# Patient Record
Sex: FEMALE | Race: WHITE | NOT HISPANIC OR LATINO | Employment: FULL TIME | URBAN - METROPOLITAN AREA
[De-identification: names, ages, dates, MRNs, and addresses within clinical notes are randomized per-mention and may not be internally consistent; named-entity substitution may affect disease eponyms.]

---

## 2021-12-07 ENCOUNTER — OFFICE VISIT (OUTPATIENT)
Dept: URGENT CARE | Facility: URGENT CARE | Age: 26
End: 2021-12-07
Payer: COMMERCIAL

## 2021-12-07 ENCOUNTER — ANCILLARY PROCEDURE (OUTPATIENT)
Dept: GENERAL RADIOLOGY | Facility: CLINIC | Age: 26
End: 2021-12-07
Attending: PHYSICIAN ASSISTANT
Payer: COMMERCIAL

## 2021-12-07 VITALS
HEIGHT: 65 IN | SYSTOLIC BLOOD PRESSURE: 112 MMHG | HEART RATE: 112 BPM | DIASTOLIC BLOOD PRESSURE: 83 MMHG | BODY MASS INDEX: 23.16 KG/M2 | OXYGEN SATURATION: 97 % | WEIGHT: 139 LBS

## 2021-12-07 DIAGNOSIS — R55 VASOVAGAL SYNCOPE: ICD-10-CM

## 2021-12-07 DIAGNOSIS — Z20.822 PERSON UNDER INVESTIGATION FOR COVID-19: ICD-10-CM

## 2021-12-07 DIAGNOSIS — R30.0 DYSURIA: Primary | ICD-10-CM

## 2021-12-07 DIAGNOSIS — R00.0 TACHYCARDIA: ICD-10-CM

## 2021-12-07 DIAGNOSIS — R30.0 DYSURIA: ICD-10-CM

## 2021-12-07 LAB
ALBUMIN UR-MCNC: 30 MG/DL
APPEARANCE UR: CLEAR
BACTERIA #/AREA URNS HPF: ABNORMAL /HPF
BILIRUB UR QL STRIP: ABNORMAL
CLUE CELLS: ABNORMAL
COLOR UR AUTO: YELLOW
FLUAV AG SPEC QL IA: NEGATIVE
FLUBV AG SPEC QL IA: NEGATIVE
GLUCOSE UR STRIP-MCNC: NEGATIVE MG/DL
HCG UR QL: NEGATIVE
HGB UR QL STRIP: NEGATIVE
KETONES UR STRIP-MCNC: 40 MG/DL
LEUKOCYTE ESTERASE UR QL STRIP: NEGATIVE
MUCOUS THREADS #/AREA URNS LPF: PRESENT /LPF
NITRATE UR QL: NEGATIVE
PH UR STRIP: 8.5 [PH] (ref 5–7)
RBC #/AREA URNS AUTO: ABNORMAL /HPF
SP GR UR STRIP: 1.01 (ref 1–1.03)
SQUAMOUS #/AREA URNS AUTO: ABNORMAL /LPF
TRICHOMONAS, WET PREP: ABNORMAL
UROBILINOGEN UR STRIP-ACNC: 4 E.U./DL
WBC #/AREA URNS AUTO: ABNORMAL /HPF
WBC'S/HIGH POWER FIELD, WET PREP: ABNORMAL
YEAST, WET PREP: ABNORMAL

## 2021-12-07 PROCEDURE — 93000 ELECTROCARDIOGRAM COMPLETE: CPT | Performed by: PHYSICIAN ASSISTANT

## 2021-12-07 PROCEDURE — 81025 URINE PREGNANCY TEST: CPT | Performed by: PHYSICIAN ASSISTANT

## 2021-12-07 PROCEDURE — 87210 SMEAR WET MOUNT SALINE/INK: CPT

## 2021-12-07 PROCEDURE — 99214 OFFICE O/P EST MOD 30 MIN: CPT | Performed by: PHYSICIAN ASSISTANT

## 2021-12-07 PROCEDURE — 74019 RADEX ABDOMEN 2 VIEWS: CPT | Performed by: RADIOLOGY

## 2021-12-07 PROCEDURE — 87804 INFLUENZA ASSAY W/OPTIC: CPT | Performed by: PHYSICIAN ASSISTANT

## 2021-12-07 PROCEDURE — 81001 URINALYSIS AUTO W/SCOPE: CPT

## 2021-12-07 ASSESSMENT — MIFFLIN-ST. JEOR: SCORE: 1381.38

## 2021-12-07 NOTE — PATIENT INSTRUCTIONS
"Emergency with chest pain, painful breaths, shortness of breath, severe headache, severe abdominal or back pain.      Patient Education     Viral Syndrome (Adult)  A viral illness may cause a number of symptoms such as fever. Other symptoms depend on the part of the body that the virus affects. If it settles in your nose, throat, and lungs, it may cause cough, sore throat, congestion, runny nose, headache, earache and other ear symptoms, or shortness of breath. If it settles in your stomach and intestinal tract, it may cause nausea, vomiting, cramping, and diarrhea. Sometimes it causes generalized symptoms like \"aching all over,\" feeling tired, loss of energy, or loss of appetite.  A viral illness usually lasts anywhere from several days to several weeks, but sometimes it lasts longer. In some cases, a more serious infection can look like a viral syndrome in the first few days of the illness. You may need another exam and additional tests to know the difference. Watch for the warning signs listed below for when to seek medical advice.  Home care  Follow these guidelines for taking care of yourself at home:    If symptoms are severe, rest at home for the first 2 to 3 days.    Stay away from cigarette smoke - both your smoke and the smoke from others.    You may use over-the-counter acetaminophen or ibuprofen for fever, muscle aching, and headache, unless another medicine was prescribed for this. If you have chronic liver or kidney disease or ever had a stomach ulcer or gastrointestinal bleeding, talk with your healthcare provider before using these medicines. No one who is younger than 18 and ill with a fever should take aspirin. It may cause severe disease or death.    Your appetite may be poor, so a light diet is fine. Avoid dehydration by drinking 8 to 12, 8-ounce glasses of fluids each day. This may include water; orange juice; lemonade; apple, grape, and cranberry juice; clear fruit drinks; electrolyte " "replacement and sports drinks; and decaffeinated teas and coffee. If you have been diagnosed with a kidney disease, ask your healthcare provider how much and what types of fluids you should drink to prevent dehydration. If you have kidney disease, drinking too much fluid can cause it build up in the your body and be dangerous to your health.    Over-the-counter remedies won't shorten the length of the illness but may be helpful for symptoms such as cough, sore throat, nasal and sinus congestion, or diarrhea. Don't use decongestants if you have high blood pressure.  Follow-up care  Follow up with your healthcare provider if you do not improve over the next week.  Call 911  Call 911 if any of the following occur:    Convulsion    Feeling weak, dizzy, or like you are going to faint    Chest pain, or more than mild shortness of breath  When to seek medical advice  Call your healthcare provider right away if any of these occur:    Cough with lots of colored sputum (mucus) or blood in your sputum    Chest pain, shortness of breath, wheezing, or trouble breathing    Severe headache; face, neck, or ear pain    Severe, constant pain in the lower right side of your belly (abdominal)    Continued vomiting (can t keep liquids down)    Frequent diarrhea (more than 5 times a day); blood (red or black color) or mucus in diarrhea    Feeling weak, dizzy, or like you are going to faint    Extreme thirst    Fever of 100.4 F (38 C) or higher, or as directed by your healthcare provider  [employer] last reviewed this educational content on 4/1/2018 2000-2021 [employer]. All rights reserved. This information is not intended as a substitute for professional medical care. Always follow your healthcare professional's instructions.           Patient Education     Vomiting (Adult)  Vomiting is a common symptom that may be due to different causes. These include gastroenteritis (\"stomach flu\"), food poisoning and gastritis. There " are other more serious causes of vomiting which may be hard to diagnose early in the illness. Therefore, it is important to watch for the warning signs listed below.  The main danger from repeated vomiting is dehydration. This is due to excess loss of water and minerals from the body. When this occurs, your body fluids must be replaced.  Home care    If symptoms are severe, rest at home for the next 24 hours.    Because your symptoms may be from an infection, wash your hands often and well. If soap and water are not available, use alcohol-based  to keep from spreading the infection to others.    Wash your hands for at least 20 seconds. Humming the happy birthday song twice while you wash is an easy way to make sure you've washed for 20 seconds.    Wash your hands after using the toilet, before and after preparing food, before eating food, after changing a diaper, cleaning a wound, caring for a sick person, and blowing your nose, coughing, or sneezing. You should also wash your hands after caring for someone who is sick, touching pet food, or treats, and touching an animal, or animal waste.    You may use acetaminophen or NSAID medicines like ibuprofen or naproxen to control fever, unless another medicine was prescribed. If you have chronic liver or kidney disease or ever had a stomach ulcer or gastrointestinal bleeding, talk with your doctor before using these medicines. Aspirin should never be used in anyone under 18 years of age who is ill with a fever. It may cause severe liver damage. Don't use NSAID medicines if you are already taking one for another condition (like arthritis) or are on aspirin (such as for heart disease, or after a stroke)    Don't use tobacco and or drink alcohol, which may worsen your symptoms.    If medicines for vomiting were prescribed, take as directed.    Once vomiting stops, then follow these guidelines:  During the first 12 to 24 hours follow the diet below:    Fruit juices.  Apple, grape juice, clear fruit drinks, and electrolyte replacement drinks.    Beverages. Soft drinks without caffeine; mineral water (plain or flavored), decaffeinated tea and coffee.    Soups. Clear broth and bouillon    Desserts. Plain gelatin, ice pops, and fruit juice bars. As you feel better, you may add 6 to 8 ounces of yogurt per day.  During the next 24 hours you may add the following to the above:    Hot cereal, plain toast, bread, rolls, crackers    Plain noodles, rice, mashed potatoes, chicken noodle or rice soup    Unsweetened canned fruit such as applesauce, bananas (avoid pineapple and citrus)    Limit caffeine and chocolate. No spices or seasonings except salt.  During the next 24 hours:  Gradually resume a normal diet, as you feel better and your symptoms lessen.  Follow-up care  Follow up with your healthcare provider, or as advised.  When to seek medical advice  Call your healthcare provider right away if any of these occur:    Constant right-sided lower belly pain or increasing general belly pain    Continued vomiting (unable to keep liquids down) for 24 hours    Vomiting blood or coffee grounds    Swollen belly    Frequent diarrhea (more than 5 times a day); blood (red or black color) or mucus in diarrhea    Reduced urine output or extreme thirst    Weakness, dizziness or fainting    Unusually drowsy or confused    Fever of 100.4 F (38 C) oral or higher, or as directed    Yellow color of the eyes or skin  [employer] last reviewed this educational content on 3/1/2018    5933-4167 [employer]. All rights reserved. This information is not intended as a substitute for professional medical care. Always follow your healthcare professional's instructions.             Follow up immediately with severe headache, chest pain, or shortness of breath    Rest, isolate for 10 days, hydrate, follow up if worsening or new symptoms  Household members to isolate until test results, if positive isolate  for 2 weeks and follow up for testing if symptoms occur         Patient Education     Coronavirus Disease 2019 (COVID-19): Caring for Yourself or Others   If you or a household member have symptoms of COVID-19, follow the guidelines below. This will help you manage symptoms and keep the virus from spreading.  If you have symptoms of COVID-19    Stay home and contact your care team. They will tell you what to do.    Don t panic. Keep in mind that other illnesses can cause similar symptoms.    Stay away from work, school, and public places.    Limit physical contact with others in your home. Limit visitors. No kissing.  Clean surfaces you touch with disinfectant.  If you need to cough or sneeze, do it into a tissue. Then throw the tissue into the trash. If you don't have tissues, cough or sneeze into the bend of your elbow.  Don t share food or personal items with people in your household. This includes items like eating and drinking utensils, towels, and bedding.  Wear a cloth face mask around other people. During a public health emergency, medical face masks may be reserved for healthcare workers. You may need to make a cloth face mask of your own. You can do this using a bandana, T-shirt, or other cloth. The CDC has instructions on how to make a face mask. Wear the mask so that it covers both your nose and mouth.  If you need to go to a hospital or clinic, call ahead to let them know. Expect the care team to wear masks, gowns, gloves, and eye protection. You may need to come to a different entrance or wait in a separate room.  Follow all instructions from your care team.    If you ve been diagnosed with COVID-19    Stay home and away from others, including other people in your home. (This is called self-isolation.) Don t leave home unless you need to get medical care. Don t go to work, school, or public places. Don t use buses, taxis, or other public transportation.    Follow all instructions from your care  team.    If you need to go to a hospital or clinic, call ahead to let them know. Expect the care team to wear masks, gowns, gloves, and eye protection. You may need to come to a different entrance or wait in a separate room.    Wear a face mask over your nose and mouth. This is to protect others from your germs. If you can t wear a mask, your caregivers should wear one. You may need to make your own mask using a bandana, T-shirt, or other cloth. See the CDC s instructions on how to make a face mask.    Have no contact with pets and other animals.    Don t share food or personal items with people in your household. This includes items like eating and drinking utensils, towels, and bedding.    If you need to cough or sneeze, do it into a tissue. Then throw the tissue into the trash. If you don't have tissues, cough or sneeze into the bend of your elbow.    Wash your hands often.    Self-care at home   At this time, there is no medicine approved to prevent or treat COVID-19. The FDA has approved an antiviral medicine (called remdesivir) for people being treated in the hospital. This is for people 12 years and older who weigh more than about 88 pounds (40 kgs). In certain cases, it may also be used for people younger than 12 years or who weigh less than about 88 pounds (40 kgs)..  Currently, treatment is mostly aimed at helping your body while it fights the virus.    Getting extra rest.    Drink extra fluids 6 to 8 glasses of liquids each day), unless a doctor has told you not to. Ask your care team which fluids are best for you. Avoid fluids that contain caffeine or alcohol.    Taking over-the-counter (OTC) medicine to reduce pain and fever. Your care team will tell you which medicine to use.  If you ve been in the hospital for COVID-19, follow your care team s instructions. They will tell you when to stop self-isolation. They may also have you change positions to help your breathing (such as lying on your belly).  If  a test showed that you have COVID-19, you may be asked to donate plasma after you ve recovered. (This is called COVID-19 convalescent plasma donation.) The plasma may contain antibodies to help fight the virus in others. Experts don't know if the plasma will work well as a treatment. Research continues, and the FDA has approved it for emergency use in certain people with serious or life-threatening COVID-19. For more information, talk to your care team.  Caring for a sick person     Follow all instructions from the care team.    Wash your hands often.    Wear protective clothing as advised.    Make sure the sick person wears a mask. If they can't wear a mask, don t stay in the same room with them. If you must be in the same room, wear a face mask. Make sure the mask covers both the nose and mouth.    Keep track of the sick person s symptoms.    Clean surfaces often with disinfectant. This includes phones, kitchen counters, fridge door handles, bathroom surfaces, and others.    Don t let anyone share household items with the sick person. This includes eating and drinking tools, towels, sheets, or blankets.    Clean fabrics and laundry well.    Keep other people and pets away from the sick person.    When you can stop self-isolation  When you are sick with COVID-19, you should stay away from other people. This is called self-isolation. The rules for ending self-isolation depend on your health in general.  If you are normally healthy:  You can stop self-isolation when all 3 of these are true:    You ve had no fever--and no medicine that reduces fever--for at least 24 hours. And     Your symptoms are better, such as cough or trouble breathing. And     At least 10 days have passed since your symptoms first started.  Talk with your care team before you leave home. They may tell you it s okay to leave, or they may give you different advice. You do not need to be re-tested.  If you have a weak immune system, or you ve had  severe COVID-19:  Follow your care team s instructions. You may be asked to self-isolate for 10 days to 20 days after your symptoms first started. Your care team may want to re-test you for COVID-19.  Note: If you re being treated for cancer, have an immune disorder (such as HIV), or have had a transplant (organ or bone marrow), you may have a weak immune system.  If you've already had COVID-19 once:  If you had the virus over 3 months ago, and you ve been exposed again, treat it like you've never had COVID-19. Stay home, limit your contact with others, call your care team, and watch for symptoms.  If it s been less than 3 months since you had the virus, you re symptom-free, and you ve been exposed again: You don t need to self-isolate or be re-tested. But if you develop new symptoms that can t be linked to another illness, please self-isolate and contact your care team.  When you return to public settings  When you are well enough to go outside your home, follow the CDC s guidance on cloth face masks.    Anyone over age 2 should wear a face mask in public, especially when it's hard to socially distance. This includes public transit, public protests and marches, and crowded stores, bars, and restaurants.    Face masks are most likely to reduce the spread of COVID-19 when they are widely used by people who are out in the public.  Certain people should not wear a face covering. These include:    Children under 2 years old    Anyone with a health, developmental, or mental health condition that can be made worse by wearing a mask    Anyone who is unconscious or unable to remove the face covering without help. See the CDC's guidance on who should not wear a face mask.  When to call your care team  Call your care team right away if a sick person has any of these:    Trouble breathing    Pain or pressure in chest  If a sick person has any of these, call 911:    Trouble breathing that gets worse    Pain or pressure in chest  that gets worse    Blue tint to lips or face    Fast or irregular heartbeat    Confusion or trouble waking    Fainting or loss of consciousness    Coughing up blood  Going home from the hospital   If you have COVID-19 and were recently in the hospital:    Follow the instructions above for self-care and isolation.    Follow the hospital care team s instructions.    Ask questions if anything is unclear to you. Write down answers so you remember them.  Date last modified: 11/23/2020  [employer] last reviewed this educational content on 4/1/2020  This information has been modified by your health care provider with permission from the publisher.    7531-3724 [employer]. [address] [place] [address], [place] [address]. All rights reserved. This information is not intended as a substitute for professional medical care. Always follow your healthcare professional's instructions.

## 2021-12-07 NOTE — PROGRESS NOTES
"SUBJECTIVE:   Daniela Constantino is a 26 year old female presenting with a chief complaint of nausea, shaky. Vomiting last night.  Has not vomited since 4 am.  Some frequency or hesitancy last night.  No dysuria.  Suprapubic pressure last night, relieved with voimiting.    No abdominal pain, some nausea.      No history of fever, URI symptoms.        There is no immunization history on file for this patient.        No past medical history on file.  No current outpatient medications on file.     Social History     Tobacco Use     Smoking status: Never Smoker     Smokeless tobacco: Never Used   Substance Use Topics     Alcohol use: Not Currently       ROS:  Review of systems negative except as stated above.    OBJECTIVE:  /83   Pulse (!) 131   Ht 1.651 m (5' 5\")   Wt 63 kg (139 lb)   SpO2 97%   BMI 23.13 kg/m    GENERAL APPEARANCE: healthy, alert and no distress  EYES: EOMI,  PERRL, conjunctiva clear  HENT: ear canals and TM's normal.  Nose and mouth without ulcers, erythema or lesions  NECK: supple, nontender, no lymphadenopathy  RESP: lungs clear to auscultation - no rales, rhonchi or wheezes  CV: regular rates and rhythm, normal S1 S2, no murmur noted  ABDOMEN:  soft, nontender, no HSM or masses and bowel sounds normal  NEURO: Normal strength and tone, sensory exam grossly normal,  normal speech and mentation  SKIN: no suspicious lesions or rashes      Results for orders placed or performed in visit on 12/07/21   Basic metabolic panel  (Ca, Cl, CO2, Creat, Gluc, K, Na, BUN)     Status: Abnormal   Result Value Ref Range    Sodium 137 133 - 144 mmol/L    Potassium 3.6 3.4 - 5.3 mmol/L    Chloride 107 94 - 109 mmol/L    Carbon Dioxide (CO2) 28 20 - 32 mmol/L    Anion Gap 2 (L) 3 - 14 mmol/L    Urea Nitrogen 8 7 - 30 mg/dL    Creatinine 0.70 0.52 - 1.04 mg/dL    Calcium 9.9 8.5 - 10.1 mg/dL    Glucose 104 (H) 70 - 99 mg/dL    GFR Estimate >90 >60 mL/min/1.73m2   CBC with platelets and differential     " Status: Abnormal   Result Value Ref Range    WBC Count 11.3 (H) 4.0 - 11.0 10e3/uL    RBC Count 4.44 3.80 - 5.20 10e6/uL    Hemoglobin 13.4 11.7 - 15.7 g/dL    Hematocrit 39.4 35.0 - 47.0 %    MCV 89 78 - 100 fL    MCH 30.2 26.5 - 33.0 pg    MCHC 34.0 31.5 - 36.5 g/dL    RDW 11.8 10.0 - 15.0 %    Platelet Count 209 150 - 450 10e3/uL    % Neutrophils 85 %    % Lymphocytes 7 %    % Monocytes 8 %    % Eosinophils 0 %    % Basophils 0 %    Absolute Neutrophils 9.6 (H) 1.6 - 8.3 10e3/uL    Absolute Lymphocytes 0.7 (L) 0.8 - 5.3 10e3/uL    Absolute Monocytes 0.9 0.0 - 1.3 10e3/uL    Absolute Eosinophils 0.0 0.0 - 0.7 10e3/uL    Absolute Basophils 0.0 0.0 - 0.2 10e3/uL   CBC with platelets and differential     Status: Abnormal    Narrative    The following orders were created for panel order CBC with platelets and differential.  Procedure                               Abnormality         Status                     ---------                               -----------         ------                     CBC with platelets and d...[[id number]]  Abnormal            Final result                 Please view results for these tests on the individual orders.   Results for orders placed or performed in visit on 12/07/21   XR KUB     Status: None    Narrative    XR KUB 12/7/2021 3:20 PM    HISTORY: Dysuria    COMPARISON: None.      Impression    IMPRESSION:  1. Right pelvic phleboliths. No definite radiodensities to suggest  urinary tract calculi.  2. Nonobstructive bowel gas pattern with mild rectal fecal loading.    FAVIOLA ZHOU MD         SYSTEM ID:  [id number]   Results for orders placed or performed in visit on 12/07/21   UA macro with reflex to Microscopic and Culture - Clinc Collect     Status: Abnormal    Specimen: Urine, Midstream   Result Value Ref Range    Color Urine Yellow Colorless, Straw, Light Yellow, Yellow    Appearance Urine Clear Clear    Glucose Urine Negative Negative mg/dL    Bilirubin Urine Small (A)  Negative    Ketones Urine 40  (A) Negative mg/dL    Specific Gravity Urine 1.015 1.003 - 1.035    Blood Urine Negative Negative    pH Urine 8.5 (H) 5.0 - 7.0    Protein Albumin Urine 30  (A) Negative mg/dL    Urobilinogen Urine 4.0 (A) 0.2, 1.0 E.U./dL    Nitrite Urine Negative Negative    Leukocyte Esterase Urine Negative Negative   Urine Microscopic     Status: Abnormal   Result Value Ref Range    Bacteria Urine Moderate (A) None Seen /HPF    RBC Urine 0-2 0-2 /HPF /HPF    WBC Urine 0-5 0-5 /HPF /HPF    Squamous Epithelials Urine Moderate (A) None Seen /LPF    Mucus Urine Present (A) None Seen /LPF    Narrative    Urine Culture not indicated   HCG qualitative urine     Status: Normal   Result Value Ref Range    hCG Urine Qualitative Negative Negative   Wet prep - Clinic Collect     Status: Abnormal    Specimen: Vagina; Swab   Result Value Ref Range    Trichomonas Absent Absent    Yeast Absent Absent    Clue Cells Absent Absent    WBCs/high power field 1+ (A) None   Influenza A & B Antigen - Clinic Collect     Status: Normal    Specimen: Nasopharyngeal; Swab   Result Value Ref Range    Influenza A antigen Negative Negative    Influenza B antigen Negative Negative    Narrative    Test results must be correlated with clinical data. If necessary, results should be confirmed by a molecular assay or viral culture.       ASSESSMENT:  (Z20.822) Person under investigation for COVID-19  (R30.0) Dysuria  (primary encounter diagnosis)  Plan: UA macro with reflex to Microscopic and Culture        - Clinc Collect, Wet prep - Clinic Collect,         Urine Microscopic, Symptomatic COVID-19 Virus         (Coronavirus) by PCR, Influenza A & B Antigen -        Clinic Collect, HCG Qual, Urine ([id number]), CBC         with platelets and differential, Basic         metabolic panel  (Ca, Cl, CO2, Creat, Gluc, K,         Na, BUN), XR KUB, Urine Culture Aerobic         Bacterial - lab collect, HCG qualitative urine    (R00.0)  "Tachycardia  (R55) Vasovagal syncope  Plan: EKG 12-lead complete w/read - Clinics          Covid-19  Pt was evaluated during a global COVID-19 pandemic, which necessitated consideration that the patient might be at risk for infection with the SARS-CoV-2 virus that causes COVID-19.   Applicable protocols for evaluation were followed during the patient's care.   COVID-19 was considered as part of the patient's evaluation. The plan for testing is:  a test was ordered during this visit.    No severe headache, chest pain, shortness of breath  No additional infectious symptoms  Rest, isolate for 10 days, hydrate, test, follow up if worsening or new symptoms  HH members to isolate until test results, if positive isolate for 2 weeks and follow up for testing if symptoms occur  Red flags and emergent follow up discussed, and understood by patient  Follow up with PCP if symptoms worsen or fail to improve    Surgical mask, gown, shield, hairnet, gloves worn by provider      Patient Instructions   Emergency with chest pain, painful breaths, shortness of breath, severe headache, severe abdominal or back pain.      Patient Education     Viral Syndrome (Adult)  A viral illness may cause a number of symptoms such as fever. Other symptoms depend on the part of the body that the virus affects. If it settles in your nose, throat, and lungs, it may cause cough, sore throat, congestion, runny nose, headache, earache and other ear symptoms, or shortness of breath. If it settles in your stomach and intestinal tract, it may cause nausea, vomiting, cramping, and diarrhea. Sometimes it causes generalized symptoms like \"aching all over,\" feeling tired, loss of energy, or loss of appetite.  A viral illness usually lasts anywhere from several days to several weeks, but sometimes it lasts longer. In some cases, a more serious infection can look like a viral syndrome in the first few days of the illness. You may need another exam and additional " tests to know the difference. Watch for the warning signs listed below for when to seek medical advice.  Home care  Follow these guidelines for taking care of yourself at home:    If symptoms are severe, rest at home for the first 2 to 3 days.    Stay away from cigarette smoke - both your smoke and the smoke from others.    You may use over-the-counter acetaminophen or ibuprofen for fever, muscle aching, and headache, unless another medicine was prescribed for this. If you have chronic liver or kidney disease or ever had a stomach ulcer or gastrointestinal bleeding, talk with your healthcare provider before using these medicines. No one who is younger than 18 and ill with a fever should take aspirin. It may cause severe disease or death.    Your appetite may be poor, so a light diet is fine. Avoid dehydration by drinking 8 to 12, 8-ounce glasses of fluids each day. This may include water; orange juice; lemonade; apple, grape, and cranberry juice; clear fruit drinks; electrolyte replacement and sports drinks; and decaffeinated teas and coffee. If you have been diagnosed with a kidney disease, ask your healthcare provider how much and what types of fluids you should drink to prevent dehydration. If you have kidney disease, drinking too much fluid can cause it build up in the your body and be dangerous to your health.    Over-the-counter remedies won't shorten the length of the illness but may be helpful for symptoms such as cough, sore throat, nasal and sinus congestion, or diarrhea. Don't use decongestants if you have high blood pressure.  Follow-up care  Follow up with your healthcare provider if you do not improve over the next week.  Call 911  Call 911 if any of the following occur:    Convulsion    Feeling weak, dizzy, or like you are going to faint    Chest pain, or more than mild shortness of breath  When to seek medical advice  Call your healthcare provider right away if any of these occur:    Cough with lots  "of colored sputum (mucus) or blood in your sputum    Chest pain, shortness of breath, wheezing, or trouble breathing    Severe headache; face, neck, or ear pain    Severe, constant pain in the lower right side of your belly (abdominal)    Continued vomiting (can t keep liquids down)    Frequent diarrhea (more than 5 times a day); blood (red or black color) or mucus in diarrhea    Feeling weak, dizzy, or like you are going to faint    Extreme thirst    Fever of 100.4 F (38 C) or higher, or as directed by your healthcare provider  [employer] last reviewed this educational content on 4/1/2018 2000-2021 [employer] All rights reserved. This information is not intended as a substitute for professional medical care. Always follow your healthcare professional's instructions.           Patient Education     Vomiting (Adult)  Vomiting is a common symptom that may be due to different causes. These include gastroenteritis (\"stomach flu\"), food poisoning and gastritis. There are other more serious causes of vomiting which may be hard to diagnose early in the illness. Therefore, it is important to watch for the warning signs listed below.  The main danger from repeated vomiting is dehydration. This is due to excess loss of water and minerals from the body. When this occurs, your body fluids must be replaced.  Home care    If symptoms are severe, rest at home for the next 24 hours.    Because your symptoms may be from an infection, wash your hands often and well. If soap and water are not available, use alcohol-based  to keep from spreading the infection to others.    Wash your hands for at least 20 seconds. Humming the happy birthday song twice while you wash is an easy way to make sure you've washed for 20 seconds.    Wash your hands after using the toilet, before and after preparing food, before eating food, after changing a diaper, cleaning a wound, caring for a sick person, and blowing your nose, " coughing, or sneezing. You should also wash your hands after caring for someone who is sick, touching pet food, or treats, and touching an animal, or animal waste.    You may use acetaminophen or NSAID medicines like ibuprofen or naproxen to control fever, unless another medicine was prescribed. If you have chronic liver or kidney disease or ever had a stomach ulcer or gastrointestinal bleeding, talk with your doctor before using these medicines. Aspirin should never be used in anyone under 18 years of age who is ill with a fever. It may cause severe liver damage. Don't use NSAID medicines if you are already taking one for another condition (like arthritis) or are on aspirin (such as for heart disease, or after a stroke)    Don't use tobacco and or drink alcohol, which may worsen your symptoms.    If medicines for vomiting were prescribed, take as directed.    Once vomiting stops, then follow these guidelines:  During the first 12 to 24 hours follow the diet below:    Fruit juices. Apple, grape juice, clear fruit drinks, and electrolyte replacement drinks.    Beverages. Soft drinks without caffeine; mineral water (plain or flavored), decaffeinated tea and coffee.    Soups. Clear broth and bouillon    Desserts. Plain gelatin, ice pops, and fruit juice bars. As you feel better, you may add 6 to 8 ounces of yogurt per day.  During the next 24 hours you may add the following to the above:    Hot cereal, plain toast, bread, rolls, crackers    Plain noodles, rice, mashed potatoes, chicken noodle or rice soup    Unsweetened canned fruit such as applesauce, bananas (avoid pineapple and citrus)    Limit caffeine and chocolate. No spices or seasonings except salt.  During the next 24 hours:  Gradually resume a normal diet, as you feel better and your symptoms lessen.  Follow-up care  Follow up with your healthcare provider, or as advised.  When to seek medical advice  Call your healthcare provider right away if any of these  occur:    Constant right-sided lower belly pain or increasing general belly pain    Continued vomiting (unable to keep liquids down) for 24 hours    Vomiting blood or coffee grounds    Swollen belly    Frequent diarrhea (more than 5 times a day); blood (red or black color) or mucus in diarrhea    Reduced urine output or extreme thirst    Weakness, dizziness or fainting    Unusually drowsy or confused    Fever of 100.4 F (38 C) oral or higher, or as directed    Yellow color of the eyes or skin  [employer] last reviewed this educational content on 3/1/2018    1238-9230 [employer]. All rights reserved. This information is not intended as a substitute for professional medical care. Always follow your healthcare professional's instructions.             Follow up immediately with severe headache, chest pain, or shortness of breath    Rest, isolate for 10 days, hydrate, follow up if worsening or new symptoms  Household members to isolate until test results, if positive isolate for 2 weeks and follow up for testing if symptoms occur         Patient Education     Coronavirus Disease 2019 (COVID-19): Caring for Yourself or Others   If you or a household member have symptoms of COVID-19, follow the guidelines below. This will help you manage symptoms and keep the virus from spreading.  If you have symptoms of COVID-19    Stay home and contact your care team. They will tell you what to do.    Don t panic. Keep in mind that other illnesses can cause similar symptoms.    Stay away from work, school, and public places.    Limit physical contact with others in your home. Limit visitors. No kissing.  Clean surfaces you touch with disinfectant.  If you need to cough or sneeze, do it into a tissue. Then throw the tissue into the trash. If you don't have tissues, cough or sneeze into the bend of your elbow.  Don t share food or personal items with people in your household. This includes items like eating and drinking  utensils, towels, and bedding.  Wear a cloth face mask around other people. During a public health emergency, medical face masks may be reserved for healthcare workers. You may need to make a cloth face mask of your own. You can do this using a bandana, T-shirt, or other cloth. The [facility] has instructions on how to make a face mask. Wear the mask so that it covers both your nose and mouth.  If you need to go to a hospital or clinic, call ahead to let them know. Expect the care team to wear masks, gowns, gloves, and eye protection. You may need to come to a different entrance or wait in a separate room.  Follow all instructions from your care team.    If you ve been diagnosed with COVID-19    Stay home and away from others, including other people in your home. (This is called self-isolation.) Don t leave home unless you need to get medical care. Don t go to work, school, or public places. Don t use buses, taxis, or other public transportation.    Follow all instructions from your care team.    If you need to go to a hospital or clinic, call ahead to let them know. Expect the care team to wear masks, gowns, gloves, and eye protection. You may need to come to a different entrance or wait in a separate room.    Wear a face mask over your nose and mouth. This is to protect others from your germs. If you can t wear a mask, your caregivers should wear one. You may need to make your own mask using a bandana, T-shirt, or other cloth. See the [facility] s instructions on how to make a face mask.    Have no contact with pets and other animals.    Don t share food or personal items with people in your household. This includes items like eating and drinking utensils, towels, and bedding.    If you need to cough or sneeze, do it into a tissue. Then throw the tissue into the trash. If you don't have tissues, cough or sneeze into the bend of your elbow.    Wash your hands often.    Self-care at home   At this time, there is no medicine  approved to prevent or treat COVID-19. The FDA has approved an antiviral medicine (called remdesivir) for people being treated in the hospital. This is for people 12 years and older who weigh more than about 88 pounds (40 kgs). In certain cases, it may also be used for people younger than 12 years or who weigh less than about 88 pounds (40 kgs)..  Currently, treatment is mostly aimed at helping your body while it fights the virus.    Getting extra rest.    Drink extra fluids 6 to 8 glasses of liquids each day), unless a doctor has told you not to. Ask your care team which fluids are best for you. Avoid fluids that contain caffeine or alcohol.    Taking over-the-counter (OTC) medicine to reduce pain and fever. Your care team will tell you which medicine to use.  If you ve been in the hospital for COVID-19, follow your care team s instructions. They will tell you when to stop self-isolation. They may also have you change positions to help your breathing (such as lying on your belly).  If a test showed that you have COVID-19, you may be asked to donate plasma after you ve recovered. (This is called COVID-19 convalescent plasma donation.) The plasma may contain antibodies to help fight the virus in others. Experts don't know if the plasma will work well as a treatment. Research continues, and the FDA has approved it for emergency use in certain people with serious or life-threatening COVID-19. For more information, talk to your care team.  Caring for a sick person     Follow all instructions from the care team.    Wash your hands often.    Wear protective clothing as advised.    Make sure the sick person wears a mask. If they can't wear a mask, don t stay in the same room with them. If you must be in the same room, wear a face mask. Make sure the mask covers both the nose and mouth.    Keep track of the sick person s symptoms.    Clean surfaces often with disinfectant. This includes phones, kitchen counters, fridge door  handles, bathroom surfaces, and others.    Don t let anyone share household items with the sick person. This includes eating and drinking tools, towels, sheets, or blankets.    Clean fabrics and laundry well.    Keep other people and pets away from the sick person.    When you can stop self-isolation  When you are sick with COVID-19, you should stay away from other people. This is called self-isolation. The rules for ending self-isolation depend on your health in general.  If you are normally healthy:  You can stop self-isolation when all 3 of these are true:    You ve had no fever--and no medicine that reduces fever--for at least 24 hours. And     Your symptoms are better, such as cough or trouble breathing. And     At least 10 days have passed since your symptoms first started.  Talk with your care team before you leave home. They may tell you it s okay to leave, or they may give you different advice. You do not need to be re-tested.  If you have a weak immune system, or you ve had severe COVID-19:  Follow your care team s instructions. You may be asked to self-isolate for 10 days to 20 days after your symptoms first started. Your care team may want to re-test you for COVID-19.  Note: If you re being treated for cancer, have an immune disorder (such as HIV), or have had a transplant (organ or bone marrow), you may have a weak immune system.  If you've already had COVID-19 once:  If you had the virus over 3 months ago, and you ve been exposed again, treat it like you've never had COVID-19. Stay home, limit your contact with others, call your care team, and watch for symptoms.  If it s been less than 3 months since you had the virus, you re symptom-free, and you ve been exposed again: You don t need to self-isolate or be re-tested. But if you develop new symptoms that can t be linked to another illness, please self-isolate and contact your care team.  When you return to public settings  When you are well enough to  go outside your home, follow the CDC s guidance on cloth face masks.    Anyone over age 2 should wear a face mask in public, especially when it's hard to socially distance. This includes public transit, public protests and marches, and crowded stores, bars, and restaurants.    Face masks are most likely to reduce the spread of COVID-19 when they are widely used by people who are out in the public.  Certain people should not wear a face covering. These include:    Children under 2 years old    Anyone with a health, developmental, or mental health condition that can be made worse by wearing a mask    Anyone who is unconscious or unable to remove the face covering without help. See the CDC's guidance on who should not wear a face mask.  When to call your care team  Call your care team right away if a sick person has any of these:    Trouble breathing    Pain or pressure in chest  If a sick person has any of these, call 911:    Trouble breathing that gets worse    Pain or pressure in chest that gets worse    Blue tint to lips or face    Fast or irregular heartbeat    Confusion or trouble waking    Fainting or loss of consciousness    Coughing up blood  Going home from the hospital   If you have COVID-19 and were recently in the hospital:    Follow the instructions above for self-care and isolation.    Follow the hospital care team s instructions.    Ask questions if anything is unclear to you. Write down answers so you remember them.  Date last modified: 11/23/2020  [employer] last reviewed this educational content on 4/1/2020  This information has been modified by your health care provider with permission from the publisher.    4666-9460 [employer]. [address] [place] [address], [place] [address]. All rights reserved. This information is not intended as a substitute for professional medical care. Always follow your healthcare professional's instructions.

## 2022-06-01 ENCOUNTER — LAB REQUISITION (OUTPATIENT)
Dept: LAB | Facility: CLINIC | Age: 27
End: 2022-06-01

## 2022-06-01 PROCEDURE — 86481 TB AG RESPONSE T-CELL SUSP: CPT | Performed by: INTERNAL MEDICINE

## 2022-06-03 LAB
QUANTIFERON MITOGEN: 10 IU/ML
QUANTIFERON NIL TUBE: 0.01 IU/ML
QUANTIFERON TB1 TUBE: 0.01 IU/ML
QUANTIFERON TB2 TUBE: 0.02

## 2022-06-04 LAB
GAMMA INTERFERON BACKGROUND BLD IA-ACNC: 0.01 IU/ML
M TB IFN-G BLD-IMP: NEGATIVE
M TB IFN-G CD4+ BCKGRND COR BLD-ACNC: 9.99 IU/ML
MITOGEN IGNF BCKGRD COR BLD-ACNC: 0 IU/ML
MITOGEN IGNF BCKGRD COR BLD-ACNC: 0.01 IU/ML

## 2024-03-19 ENCOUNTER — OFFICE VISIT (OUTPATIENT)
Dept: OBGYN | Facility: CLINIC | Age: 29
End: 2024-03-19
Payer: COMMERCIAL

## 2024-03-19 VITALS
SYSTOLIC BLOOD PRESSURE: 112 MMHG | WEIGHT: 138 LBS | HEIGHT: 66 IN | BODY MASS INDEX: 22.18 KG/M2 | DIASTOLIC BLOOD PRESSURE: 66 MMHG

## 2024-03-19 DIAGNOSIS — Z12.4 SCREENING FOR MALIGNANT NEOPLASM OF CERVIX: ICD-10-CM

## 2024-03-19 DIAGNOSIS — Z30.09 GENERAL COUNSELING AND ADVICE FOR CONTRACEPTIVE MANAGEMENT: ICD-10-CM

## 2024-03-19 DIAGNOSIS — Z01.419 ENCOUNTER FOR GYNECOLOGICAL EXAMINATION WITHOUT ABNORMAL FINDING: Primary | ICD-10-CM

## 2024-03-19 PROCEDURE — 99385 PREV VISIT NEW AGE 18-39: CPT | Performed by: OBSTETRICS & GYNECOLOGY

## 2024-03-19 PROCEDURE — G0145 SCR C/V CYTO,THINLAYER,RESCR: HCPCS | Performed by: OBSTETRICS & GYNECOLOGY

## 2024-03-19 NOTE — PROGRESS NOTES
Preventive Care Visit  [facility] SUAD Singh MD, OB/Gyn  Mar 19, 2024       SUBJECTIVE:   Daniela is a 28 year old, presenting for the following:  No chief complaint on file.      Due for Pap. Not sexually active but getting  in 4 months. Wanted info on contraception, preferably ones that won't adversely affect mood because some OCPs have done that in the past, as well as help her acne.        Have you ever done Advance Care Planning? (For example, a Health Directive, POLST, or a discussion with a medical provider or your loved ones about your wishes): No, advance care planning information given to patient to review.  Patient plans to discuss their wishes with loved ones or provider.      Social History     Tobacco Use    Smoking status: Never    Smokeless tobacco: Never   Substance Use Topics    Alcohol use: Not Currently              No data to display              Reviewed orders with patient.  Reviewed health maintenance and updated orders accordingly - Yes  No current outpatient medications on file.     Allergies   Allergen Reactions    Seasonal Allergies Cough       Breast Cancer Screening:    FHS-7:        No data to display              Mammogram Screening - Patient under 40 years of age: Routine Mammogram Screening not recommended.   Pertinent mammograms are reviewed under the imaging tab.    History of abnormal Pap smear: NO - age 21-29 PAP every 3 years recommended     Reviewed and updated as needed this visit by clinical staff                  Reviewed and updated as needed this visit by Provider                  Past Medical History:   Diagnosis Date    No pertinent past medical history       Past Surgical History:   Procedure Laterality Date    COSMETIC SURGERY      precancerous spot on face removed when patient was toddler/ age     OB History    Para Term  AB Living   0 0 0 0 0 0   SAB IAB Ectopic Multiple Live Births   0 0 0 0 0     Review of  "Systems    Review of Systems  Constitutional, HEENT, cardiovascular, pulmonary, GI, , musculoskeletal, neuro, skin, endocrine and psych systems are negative, except as otherwise noted.    OBJECTIVE:   /66   Ht 1.664 m (5' 5.5\")   Wt 62.6 kg (138 lb)   LMP 03/15/2024   BMI 22.62 kg/m     Estimated body mass index is 22.62 kg/m  as calculated from the following:    Height as of this encounter: 1.664 m (5' 5.5\").    Weight as of this encounter: 62.6 kg (138 lb).  Physical Exam  GENERAL: alert and no distress  EYES: Eyes grossly normal to inspection, PERRL and conjunctivae and sclerae normal  HENT: ear canals and TM's normal, nose and mouth without ulcers or lesions  NECK: no adenopathy, no asymmetry, masses, or scars  RESP: lungs clear to auscultation - no rales, rhonchi or wheezes  BREAST: normal without masses, tenderness or nipple discharge and no palpable axillary masses or adenopathy  CV: regular rate and rhythm, normal S1 S2, no S3 or S4, no murmur, click or rub, no peripheral edema  ABDOMEN: soft, nontender, no hepatosplenomegaly, no masses and bowel sounds normal   (female) w/bimanual: normal female external genitalia, normal urethral meatus, normal vaginal mucosa, and normal cervix/adnexa/uterus without masses or discharge  MS: no gross musculoskeletal defects noted, no edema  SKIN: no suspicious lesions or rashes  NEURO: Normal strength and tone, mentation intact and speech normal  PSYCH: mentation appears normal, affect normal/bright    Diagnostic Test Results:    ASSESSMENT/PLAN:   Encounter for gynecological examination without abnormal finding  - Normal exam    General counseling and advice for contraceptive management  - Reviewed OCPs, IUDs, Nexplanon, NuvaRing, Depo-Provera, OrthoEvra  - Pt will likely consider Magy or Ilsa, vs Mirena/Kyleena IUDs  - Can call for OCPs if desired, or schedule IUD during menses.    Screening for malignant neoplasm of cervix   - Pap done.  - If normal, next " due 3 years.  - If abnl, follow-up per [facility].      Patient has been advised of split billing requirements and indicates understanding: Yes      Counseling  Reviewed preventive health counseling, as reflected in patient instructions  Special attention given to:        Contraception        She reports that she has never smoked. She has never used smokeless tobacco.          Signed Electronically by: Jake Singh MD

## 2024-03-19 NOTE — NURSING NOTE
"Chief Complaint   Patient presents with    Physical     Pap smear due. Last pap was 19- NIL.     Contraception     Is getting  in July and would like to discuss birth control options. Was on the pill in high-school.        Initial /66   Ht 1.664 m (5' 5.5\")   Wt 62.6 kg (138 lb)   LMP 03/15/2024   BMI 22.62 kg/m   Estimated body mass index is 22.62 kg/m  as calculated from the following:    Height as of this encounter: 1.664 m (5' 5.5\").    Weight as of this encounter: 62.6 kg (138 lb).  BP completed using cuff size: regular    Questioned patient about current smoking habits.  Pt. has never smoked.          The following HM Due: pap smear    Volodymyr Segundo CMA               "

## 2024-03-25 LAB
BKR LAB AP GYN ADEQUACY: NORMAL
BKR LAB AP GYN INTERPRETATION: NORMAL
BKR LAB AP HPV REFLEX: NORMAL
BKR LAB AP LMP: NORMAL
BKR LAB AP PREVIOUS ABNORMAL: NORMAL
PATH REPORT.COMMENTS IMP SPEC: NORMAL
PATH REPORT.COMMENTS IMP SPEC: NORMAL
PATH REPORT.RELEVANT HX SPEC: NORMAL

## 2024-06-03 ENCOUNTER — OFFICE VISIT (OUTPATIENT)
Dept: URGENT CARE | Facility: URGENT CARE | Age: 29
End: 2024-06-03
Payer: COMMERCIAL

## 2024-06-03 VITALS
TEMPERATURE: 98.7 F | HEART RATE: 90 BPM | RESPIRATION RATE: 16 BRPM | DIASTOLIC BLOOD PRESSURE: 72 MMHG | SYSTOLIC BLOOD PRESSURE: 105 MMHG | OXYGEN SATURATION: 97 % | WEIGHT: 142.4 LBS | BODY MASS INDEX: 23.34 KG/M2

## 2024-06-03 DIAGNOSIS — J06.9 VIRAL URI WITH COUGH: Primary | ICD-10-CM

## 2024-06-03 DIAGNOSIS — H65.93 MIDDLE EAR EFFUSION, BILATERAL: ICD-10-CM

## 2024-06-03 PROCEDURE — 99213 OFFICE O/P EST LOW 20 MIN: CPT | Performed by: NURSE PRACTITIONER

## 2024-06-03 RX ORDER — AZELAIC ACID 0.15 G/G
GEL TOPICAL
COMMUNITY
Start: 2024-04-10

## 2024-06-03 RX ORDER — SPIRONOLACTONE 50 MG/1
TABLET, FILM COATED ORAL
COMMUNITY
Start: 2024-05-22

## 2024-06-03 RX ORDER — TRETINOIN 0.5 MG/G
CREAM TOPICAL
COMMUNITY
Start: 2024-04-10

## 2024-06-03 NOTE — PATIENT INSTRUCTIONS
Try Sudafed from behind the pharmacy counter    Viral Respiratory Infection: Care Instructions  Overview     A viral respiratory infection is an infection of the nose, sinuses, or throat caused by a virus. Colds and the flu are common types of viral respiratory infections.  The symptoms of a viral respiratory infection often start quickly. They include a fever, sore throat, and runny nose. You may also just not feel well. Or you may not want to eat much.  Most viral infections can be treated with home care. This may include drinking lots of fluids and taking over-the-counter pain medicine. You will probably feel better in 4 to 10 days.  Antibiotics are not used to treat a viral infection. Antibiotics don't kill viruses, so they won't help cure a viral illness.  In some cases, a doctor may prescribe antiviral medicine to help your body fight a serious viral infection.  Follow-up care is a key part of your treatment and safety. Be sure to make and go to all appointments, and call your doctor if you are having problems. It's also a good idea to know your test results and keep a list of the medicines you take.  How can you care for yourself at home?  To prevent dehydration, drink plenty of fluids. Choose water and other clear liquids until you feel better. If you have kidney, heart, or liver disease and have to limit fluids, talk with your doctor before you increase the amount of fluids you drink.  Ask your doctor if you can take an over-the-counter pain medicine, such as acetaminophen (Tylenol), ibuprofen (Advil, Motrin), or naproxen (Aleve). Be safe with medicines. Read and follow all instructions on the label. No one younger than 20 should take aspirin. It has been linked to Reye syndrome, a serious illness.  Be careful when taking over-the-counter cold or flu medicines and Tylenol at the same time. Many of these medicines have acetaminophen, which is Tylenol. Read the labels to make sure that you are not taking more  than the recommended dose. Too much acetaminophen (Tylenol) can be harmful.  Get plenty of rest.  Use saline (saltwater) nasal washes to help keep your nasal passages open and wash out mucus and allergens. You can buy saline nose sprays at a grocery store or drugstore. Follow the instructions on the package. Or you can make your own at home. Add 1 teaspoon of non-iodized salt and 1 teaspoon of baking soda to 2 cups of distilled or boiled and cooled water. Fill a squeeze bottle or neti pot with the nasal wash. Then put the tip into your nostril, and lean over the sink. With your mouth open, gently squirt the liquid. Repeat on the other side.  Use a vaporizer or humidifier to add moisture to your bedroom. Follow the instructions for cleaning the machine.  Do not smoke or allow others to smoke around you. If you need help quitting, talk to your doctor about stop-smoking programs and medicines. These can increase your chances of quitting for good.  When should you call for help?   Call 911 anytime you think you may need emergency care. For example, call if:    You have severe trouble breathing.   Call your doctor now or seek immediate medical care if:    You have a new or higher fever.     Your fever lasts more than 48 hours.     You have trouble breathing.     You have a fever with a stiff neck or a severe headache.     You are sensitive to light.     You feel very sleepy or confused.   Watch closely for changes in your health, and be sure to contact your doctor if:    You do not get better as expected.

## 2024-06-03 NOTE — PROGRESS NOTES
ICD-10-CM    1. Viral URI with cough  J06.9       2. Middle ear effusion, bilateral  H65.93       Rest.  Fluids.  Delsym for cough suppression at night.  Mucinex as desired during the day.  Tylenol or ibuprofen as needed for fever or pain.  Recheck in 10 days if symptoms have not improved, sooner if they worsen.  Decongestant as needed.    Red flag warning signs and when to go to the emergency room discussed.  Reviewed potential adverse reactions to medications.    Bilateral tympanic membranes have clear fluid levels present,SUBJECTIVE:   Daniela Constantino is a 28 year old female presenting with a chief complaint of   Chief Complaint   Patient presents with    Urgent Care     Pt presents with left ear pain onset yesterday.   .    Review of systems is negative except for as noted in the HPI.    OBJECTIVE  /72   Pulse 90   Temp 98.7  F (37.1  C) (Tympanic)   Resp 16   Wt 64.6 kg (142 lb 6.4 oz)   SpO2 97%   BMI 23.34 kg/m        GENERAL: Alert, mild distress  SKIN: skin is clear, no rash or abnormal pigmentation  HEAD: The head is normocephalic.   EYES: The eyes are normal. The conjunctivae and cornea normal.   NECK: The neck is supple and thyroid is normal, no masses; LYMPH NODES: No adenopathy  HENT: Pharynx appears normal, nasal passages have small amount of clear rhinorrhea present  LUNGS: The lung fields are clear to auscultation, no rales, rhonchi, wheezing or retractions  CV: Rhythm is regular. S1 and S2 are normal. No murmurs.  EXTREMITIES: Symmetric extremities no deformities    MALORIE Daniels, CNP  [facility] Provider    The use of Dragon/Provender dictation services may have been used to construct the content in this note; any grammatical or spelling errors are non-intentional. Please contact the author of this note directly if you are in need of any clarification.